# Patient Record
Sex: FEMALE | Race: WHITE | Employment: FULL TIME | URBAN - METROPOLITAN AREA
[De-identification: names, ages, dates, MRNs, and addresses within clinical notes are randomized per-mention and may not be internally consistent; named-entity substitution may affect disease eponyms.]

---

## 2020-07-27 ENCOUNTER — VIRTUAL VISIT (OUTPATIENT)
Dept: INTERNAL MEDICINE CLINIC | Age: 55
End: 2020-07-27
Payer: COMMERCIAL

## 2020-07-27 DIAGNOSIS — F51.01 PRIMARY INSOMNIA: Primary | ICD-10-CM

## 2020-07-27 DIAGNOSIS — R42 DIZZINESS: ICD-10-CM

## 2020-07-27 PROCEDURE — 99213 OFFICE O/P EST LOW 20 MIN: CPT | Performed by: INTERNAL MEDICINE

## 2020-07-27 RX ORDER — ZOLPIDEM TARTRATE 5 MG/1
5 TABLET ORAL
Qty: 30 TAB | Refills: 0 | Status: SHIPPED | OUTPATIENT
Start: 2020-07-27 | End: 2020-08-26

## 2020-07-27 NOTE — PROGRESS NOTES
Jamison Raymundo is a 54 y.o. female presenting for a virtual visit using HIPAA compliant video conferencing technology. The patient has previously provided full consent to use this technology and understands the risks and benefits of proceeding. I have seeing the patient today from my office in [place] and she is in her home. Chief Complaint   Patient presents with   Lolly Mcintosh is seen for a virtual visit today. She has just started a new job and is having some trouble sleeping. She is having trouble working 12 hours without getting a good nights rest.  She has been taking her 's Xanax. She wonders if she can get something on her own. She would probably have to take it for 5 nights a week. She formally took Ambien when she was seeing Dr. Malena Knapp and that seemed to work well for her. She is also a little concerned about some intermittent dizziness that she is having. There is no rhyme or reason to it. It comes and can last as long as an hour but just goes away without intervention. She is concerned about her blood work and would like to have that checked. No past medical history on file. No current outpatient medications on file prior to visit. No current facility-administered medications on file prior to visit. ROS generally Mayra has felt well without fevers chills night sweats or change in her weight. No visual or auditory disturbances no epistaxis no tooth or gum complaints neck no pain or stiffness no swelling. No history of thyroid disease respiratory no cough sputum production wheeze or shortness of breath no chest pain no palpitations no PND or orthopnea. No nausea or vomiting no change in bowel habits. There were no vitals taken for this visit. Physical Exam physical examination is not possible because of the virtual nature of her visit. Assessment and Plan  I am going to let Mayra try some Ambien.   She knows I do not want her to take it on a nightly basis and only use it as needed. At the same time we will check a CBC and a CMP because of her dizziness. She will call us if she has any change in her symptoms.

## 2020-08-19 ENCOUNTER — TELEPHONE (OUTPATIENT)
Dept: INTERNAL MEDICINE CLINIC | Age: 55
End: 2020-08-19

## 2020-11-02 DIAGNOSIS — F51.01 PRIMARY INSOMNIA: Primary | ICD-10-CM

## 2020-11-02 RX ORDER — ZOLPIDEM TARTRATE 5 MG/1
TABLET ORAL
Qty: 15 TAB | Refills: 0 | Status: SHIPPED | OUTPATIENT
Start: 2020-11-02 | End: 2021-01-19

## 2021-01-19 ENCOUNTER — OFFICE VISIT (OUTPATIENT)
Dept: FAMILY MEDICINE CLINIC | Age: 56
End: 2021-01-19
Payer: COMMERCIAL

## 2021-01-19 VITALS
SYSTOLIC BLOOD PRESSURE: 127 MMHG | HEART RATE: 65 BPM | OXYGEN SATURATION: 98 % | TEMPERATURE: 98.2 F | DIASTOLIC BLOOD PRESSURE: 64 MMHG

## 2021-01-19 DIAGNOSIS — R05.9 COUGH: ICD-10-CM

## 2021-01-19 DIAGNOSIS — Z20.822 SUSPECTED COVID-19 VIRUS INFECTION: ICD-10-CM

## 2021-01-19 DIAGNOSIS — Z20.822 SUSPECTED COVID-19 VIRUS INFECTION: Primary | ICD-10-CM

## 2021-01-19 DIAGNOSIS — R68.89 FLU-LIKE SYMPTOMS: ICD-10-CM

## 2021-01-19 LAB
FLUAV+FLUBV AG NOSE QL IA.RAPID: NEGATIVE
FLUAV+FLUBV AG NOSE QL IA.RAPID: NEGATIVE
VALID INTERNAL CONTROL?: YES

## 2021-01-19 PROCEDURE — 99213 OFFICE O/P EST LOW 20 MIN: CPT | Performed by: INTERNAL MEDICINE

## 2021-01-19 PROCEDURE — 87804 INFLUENZA ASSAY W/OPTIC: CPT | Performed by: INTERNAL MEDICINE

## 2021-01-19 NOTE — PROGRESS NOTES
.  This is a very pleasant patient who was seen in clinic today for an acute visit. Assessment/Plan:      1. Suspected COVID-19 virus infection  Given her positive contacts and the fact she has body aches sore throat headache generalized muscle aches cough and mild shortness of breath I strongly suspect she has COVID-19. The patient is being placed on home quarantine for 10+1 days. We will obtain a rapid Covid as well. She has been advised to go to the emergency room should she develop any crushing chest pain or worsening shortness of breath.  - NOVEL CORONAVIRUS (COVID-19); Future    2. Cough  Again refer to above. She has no rhonchi on exam and no wheezes. I am going to check a influenza as well  - NOVEL CORONAVIRUS (COVID-19); Future  - INFLUENZA A & B AG (RAPID TEST); Future       Reviewed with him that COVID-19 pandemic is an evolving situation with rapidly changing recommendations & guidelines. Medical decisions are made based on the the best information available at the time. Recommended he stay tuned for updates published by trusted sources and to advise your PCP of any unexpected changes in clinical condition       Chief Complaint   Patient presents with    Headache    Cough      Subjective:   HPI     This is a very pleasant 75-year-old female who presents today complaining of 24 hours of Malays body aches low-grade temps chills sore throat headache dry cough and intermittent shortness of breath. She reports multiple contacts of hers who has had the Covid virus including a friend whose father is now in the hospital.  She thought she was fine up until yesterday when she developed her symptoms all at once. She went to bed thinking it was nothing but then woke up just feeling awful. She describes it almost like a train hitting her.   She has not noticed any change in smell or taste  Recent Travel Screening and Travel History documentation  Exam      ROS  - GEN: no weight gain/loss, + fevers + chills  - HEENT: no vision changes, no tinnitus, + sore throat  - CV: no cp, palpitations or edema  - RESP: + sob, +cough  - ABD: no n/v/d, no blood in stool + diarrhea  - : no dysuria or changes in freq. - SKIN: no rashes, ulcers  - Neuro: no resting tremors, parasthesia in extremities, no headaches  - MS: No weakness in extremities, no gait abnormalities  - Other    Visit Vitals  /64   Pulse 65   Temp 98.2 °F (36.8 °C)   SpO2 98%        Physical Exam  Constitutional:       Appearance: Normal appearance. Ava Graven NAD and pleasant  HENT:      Head: Normocephalic. Nose: Nose normal.      Mouth/Throat:      Mouth: Mucous membranes are moist. Throat mildly inflammed without exudates  Eyes:      Extraocular Movements: Extraocular movements intact. Conjunctiva/sclera: Conjunctivae normal. Sclera anicteric     Pupils: Pupils are equal, round, and reactive to light. Cardiovascular:      Rate and Rhythm: Normal rate and regular rhythm. Pulses: Normal pulses. Pulmonary:      Effort: No respiratory distress. Breath sounds: CTAB and No stridor. No rhonchi. Abdominal:      General: There is no distension. NT, ND  Neurological:      Mental Status: She is alert and oriented times 3. No resting tremor, normal gait     Cranial Nerves: cranial nerves grossly intact    No current outpatient medications on file prior to visit. No current facility-administered medications on file prior to visit. Disclaimer:  Discussed expected course/resolution/complications of diagnosis in detail with patient. Medication risks/benefits/costs/interactions/alternatives discussed with patient. He was given an after visit summary which includes diagnoses, current medications, & vitals. He expressed understanding with the diagnosis and plan.

## 2021-01-19 NOTE — LETTER
NOTIFICATION RETURN TO WORK / SCHOOL 
 
1/19/2021 11:03 AM 
 
Ms. Megan Marquez [id number] To Whom It May Concern: 
 
Natasha Dumont is currently under the care of Abel Randall. She was seen on 1/19/2021, tested for Covid and needs to quarantine a minimum of 10 days. She will return to work/school on: Pending test results. If there are questions or concerns please have the patient contact our office. Sincerely, Carlos Julien MD

## 2021-01-19 NOTE — PROGRESS NOTES
Patient was exposed to a friend of Friday who tested positive for Covid. Body aches,fatigue,cough,headache,no appetite, nausea, shortness of breath. Lon Agrawal presents today for   Chief Complaint   Patient presents with    Headache    Cough       Is someone accompanying this pt? no    Is the patient using any DME equipment during OV? no    Depression Screening:  3 most recent PHQ Screens 1/19/2021   Little interest or pleasure in doing things Not at all   Feeling down, depressed, irritable, or hopeless Not at all   Total Score PHQ 2 0       Learning Assessment:  No flowsheet data found. Health Maintenance reviewed and discussed and ordered per Provider. Health Maintenance Due   Topic Date Due    DTaP/Tdap/Td series (1 - Tdap) 02/24/1986    PAP AKA CERVICAL CYTOLOGY  02/24/1986    Lipid Screen  02/24/2005    Shingrix Vaccine Age 50> (1 of 2) 02/24/2015    Colorectal Cancer Screening Combo  02/24/2015    Breast Cancer Screen Mammogram  02/24/2015    Flu Vaccine (1) 09/01/2020   . Coordination of Care:  1. Have you been to the ER, urgent care clinic since your last visit? Hospitalized since your last visit? no    2. Have you seen or consulted any other health care providers outside of the [address] [place] [address] since your last visit? Include any pap smears or colon screening.  no

## 2021-01-21 LAB
SARS-COV-2, NAA: NOT DETECTED
SPECIMEN STATUS REPORT, ROLRST: NORMAL

## 2023-08-15 ENCOUNTER — HOSPITAL ENCOUNTER (OUTPATIENT)
Age: 58
Discharge: HOME | End: 2023-08-18
Payer: COMMERCIAL

## 2023-08-15 DIAGNOSIS — M79.672 LEFT FOOT PAIN: ICD-10-CM

## 2023-08-15 PROCEDURE — 73630 X-RAY EXAM OF FOOT: CPT

## 2024-04-11 DIAGNOSIS — M25.562 LEFT KNEE PAIN, UNSPECIFIED CHRONICITY: Primary | ICD-10-CM

## 2024-07-25 ENCOUNTER — HOSPITAL ENCOUNTER (OUTPATIENT)
Age: 59
Discharge: HOME | End: 2024-07-25
Payer: COMMERCIAL

## 2024-07-25 DIAGNOSIS — R10.84 GENERALIZED ABDOMINAL PAIN: ICD-10-CM

## 2024-07-25 PROCEDURE — 74019 RADEX ABDOMEN 2 VIEWS: CPT

## 2024-12-17 ENCOUNTER — HOSPITAL ENCOUNTER (OUTPATIENT)
Age: 59
Setting detail: OUTPATIENT SURGERY
Discharge: HOME | End: 2024-12-17
Attending: INTERNAL MEDICINE | Admitting: INTERNAL MEDICINE
Payer: COMMERCIAL

## 2024-12-17 ENCOUNTER — ANESTHESIA (OUTPATIENT)
Age: 59
End: 2024-12-17
Payer: COMMERCIAL

## 2024-12-17 ENCOUNTER — ANESTHESIA EVENT (OUTPATIENT)
Age: 59
End: 2024-12-17
Payer: COMMERCIAL

## 2024-12-17 ENCOUNTER — PREP FOR PROCEDURE (OUTPATIENT)
Age: 59
End: 2024-12-17

## 2024-12-17 ENCOUNTER — TELEPHONE (OUTPATIENT)
Age: 59
End: 2024-12-17

## 2024-12-17 VITALS
HEART RATE: 59 BPM | OXYGEN SATURATION: 100 % | SYSTOLIC BLOOD PRESSURE: 125 MMHG | RESPIRATION RATE: 16 BRPM | DIASTOLIC BLOOD PRESSURE: 83 MMHG | TEMPERATURE: 97.6 F

## 2024-12-17 DIAGNOSIS — R63.4 WEIGHT LOSS, UNINTENTIONAL: ICD-10-CM

## 2024-12-17 DIAGNOSIS — R11.2 NAUSEA AND VOMITING, UNSPECIFIED VOMITING TYPE: ICD-10-CM

## 2024-12-17 DIAGNOSIS — R10.13 EPIGASTRIC PAIN: Primary | ICD-10-CM

## 2024-12-17 PROCEDURE — 6360000002 HC RX W HCPCS: Performed by: NURSE ANESTHETIST, CERTIFIED REGISTERED

## 2024-12-17 PROCEDURE — 3600007501: Performed by: INTERNAL MEDICINE

## 2024-12-17 PROCEDURE — 3600007511: Performed by: INTERNAL MEDICINE

## 2024-12-17 PROCEDURE — 2580000003 HC RX 258: Performed by: INTERNAL MEDICINE

## 2024-12-17 PROCEDURE — 3700000001 HC ADD 15 MINUTES (ANESTHESIA): Performed by: INTERNAL MEDICINE

## 2024-12-17 PROCEDURE — 2709999900 HC NON-CHARGEABLE SUPPLY: Performed by: INTERNAL MEDICINE

## 2024-12-17 PROCEDURE — 7100000010 HC PHASE II RECOVERY - FIRST 15 MIN: Performed by: INTERNAL MEDICINE

## 2024-12-17 PROCEDURE — 3700000000 HC ANESTHESIA ATTENDED CARE: Performed by: INTERNAL MEDICINE

## 2024-12-17 PROCEDURE — 43235 EGD DIAGNOSTIC BRUSH WASH: CPT | Performed by: INTERNAL MEDICINE

## 2024-12-17 PROCEDURE — 7100000011 HC PHASE II RECOVERY - ADDTL 15 MIN: Performed by: INTERNAL MEDICINE

## 2024-12-17 RX ORDER — DIAZEPAM 2 MG/1
2 TABLET ORAL EVERY 6 HOURS PRN
COMMUNITY
Start: 2024-11-04

## 2024-12-17 RX ORDER — FUROSEMIDE 20 MG/1
20 TABLET ORAL DAILY
COMMUNITY
Start: 2024-11-15

## 2024-12-17 RX ORDER — LEVOCETIRIZINE DIHYDROCHLORIDE 5 MG/1
5 TABLET, FILM COATED ORAL NIGHTLY
COMMUNITY
Start: 2024-12-05

## 2024-12-17 RX ORDER — SODIUM CHLORIDE, SODIUM LACTATE, POTASSIUM CHLORIDE, CALCIUM CHLORIDE 600; 310; 30; 20 MG/100ML; MG/100ML; MG/100ML; MG/100ML
INJECTION, SOLUTION INTRAVENOUS CONTINUOUS
Status: DISCONTINUED | OUTPATIENT
Start: 2024-12-17 | End: 2024-12-17 | Stop reason: HOSPADM

## 2024-12-17 RX ORDER — SODIUM CHLORIDE, SODIUM LACTATE, POTASSIUM CHLORIDE, CALCIUM CHLORIDE 600; 310; 30; 20 MG/100ML; MG/100ML; MG/100ML; MG/100ML
INJECTION, SOLUTION INTRAVENOUS CONTINUOUS
Status: CANCELLED | OUTPATIENT
Start: 2024-12-17

## 2024-12-17 RX ORDER — PROPOFOL 10 MG/ML
INJECTION, EMULSION INTRAVENOUS
Status: DISCONTINUED | OUTPATIENT
Start: 2024-12-17 | End: 2024-12-17 | Stop reason: SDUPTHER

## 2024-12-17 RX ADMIN — PROPOFOL 50 MG: 10 INJECTION, EMULSION INTRAVENOUS at 13:43

## 2024-12-17 RX ADMIN — SODIUM CHLORIDE, POTASSIUM CHLORIDE, SODIUM LACTATE AND CALCIUM CHLORIDE: 600; 310; 30; 20 INJECTION, SOLUTION INTRAVENOUS at 13:13

## 2024-12-17 RX ADMIN — PROPOFOL 200 MG: 10 INJECTION, EMULSION INTRAVENOUS at 13:44

## 2024-12-17 ASSESSMENT — PAIN - FUNCTIONAL ASSESSMENT
PAIN_FUNCTIONAL_ASSESSMENT: ADULT NONVERBAL PAIN SCALE (NPVS)
PAIN_FUNCTIONAL_ASSESSMENT: NONE - DENIES PAIN

## 2024-12-17 NOTE — ANESTHESIA POSTPROCEDURE EVALUATION
Department of Anesthesiology  Postprocedure Note    Patient: Izabela Perez  MRN: [id number]  YOB: 1965  Date of evaluation: 12/17/2024    Procedure Summary       Date: 12/17/24 Room / Location: [facility] 01 / [facility] ENDOSCOPY    Anesthesia Start: 1331 Anesthesia Stop: 1349    Procedure: EGD (Upper GI Region) Diagnosis:       Burning sensation of stomach      (Burning sensation of stomach [K30])    Surgeons: Neal Smyth MD Responsible Provider:     Anesthesia Type: MAC ASA Status: 2            Anesthesia Type: MAC    Peter Phase I:      Peter Phase II:      Anesthesia Post Evaluation    Patient location during evaluation: bedside  Patient participation: complete - patient participated  Level of consciousness: awake and awake and alert  Pain score: 0  Airway patency: patent  Nausea & Vomiting: no nausea  Cardiovascular status: blood pressure returned to baseline  Respiratory status: acceptable  Hydration status: euvolemic  Multimodal analgesia pain management approach  Pain management: adequate    No notable events documented.

## 2024-12-17 NOTE — TELEPHONE ENCOUNTER
Referring Physician: Soraida Reza, nurse practitioner    Chief Complaint: Epigastric pain and nausea    Date of service: 2024    Subjective:     History of Present Illness:  Patient is a female White (non-) 59 y.o.  who is seen for evaluation for epigastric pain and nausea.  The history is from the patient and their medical records.      The patient has GI complaints of epigastric pain and nausea for the last 10 days.  She has had no vomiting, hematemesis, melena or rectal bleeding.  She states she is lost 10 pounds unintentionally in the last 10 days because it hurts in her abdomen to eat.  She reports the pain is epigastric and does not radiate to her back or elsewhere but it is worsened with eating.    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  She has a long history of ibuprofen use.  She says for the last 15 years she is ibuprofen daily anywhere from 400-800 mg 1-4 times a day.  Until 5 days ago, she had never taken a PPI but has been using an acids.    She was seen by general surgery 2024 for abdominal pain and acute abdominal x-ray showed some nonspecific air-fluid levels in the small intestine and some moderate stool ball burden consistent with constipation but no etiology explain her pain.  CT scan of the abdomen/pelvis was ordered but I do not see that that was done.  She says she never had it done.  CBC, CMP, and lipase are all normal.    The patient denies  vomiting, fever, chills, reflux, dysphagia, change in bowel habits, diarrhea, constipation, weight loss, rectal bleeding, or melena unless otherwise stated.      Last EGD -none.  Last colonoscopy -.  Family history for GI disease is significant for mother  of colon cancer with obstruction.   The patient denies liver related risk factors.    Tobacco use -former and none in the last 10 years.  Alcohol use -occasional such as 1-3 drinks a week.  Polysubstance use

## 2024-12-17 NOTE — ANESTHESIA PRE PROCEDURE
Department of Anesthesiology  Preprocedure Note       Name:  Izabela Perez   Age:  59 y.o.  :  1965                                          MRN:  [id number]         Date:  2024      Surgeon: Surgeon(s):  Neal Smyth MD    Procedure: Procedure(s):  EGD    Medications prior to admission:   Prior to Admission medications    Not on File       Current medications:    No current facility-administered medications for this encounter.       Allergies:    Allergies   Allergen Reactions   • Azithromycin Itching     Other reaction(s): Unknown (comments)   • Codeine Itching   • Doxycycline Itching     Other reaction(s): Unknown (comments)   • Hydromorphone Hives   • Levofloxacin Itching   • Sulfa Antibiotics Hives   • Tetracycline Itching       Problem List:  There is no problem list on file for this patient.      Past Medical History:  No past medical history on file.    Past Surgical History:  No past surgical history on file.    Social History:    Social History     Tobacco Use   • Smoking status: Never   • Smokeless tobacco: Never   Substance Use Topics   • Alcohol use: Not on file                                Counseling given: Not Answered      Vital Signs (Current): There were no vitals filed for this visit.                                           BP Readings from Last 3 Encounters:   No data found for BP       NPO Status:                                                                                 BMI:   Wt Readings from Last 3 Encounters:   No data found for Wt     There is no height or weight on file to calculate BMI.    CBC: No results found for: \"WBC\", \"RBC\", \"HGB\", \"HCT\", \"MCV\", \"RDW\", \"PLT\"    CMP: No results found for: \"NA\", \"K\", \"CL\", \"CO2\", \"BUN\", \"CREATININE\", \"GFRAA\", \"AGRATIO\", \"LABGLOM\", \"GLUCOSE\", \"GLU\", \"CALCIUM\", \"BILITOT\", \"ALKPHOS\", \"AST\", \"ALT\"    POC Tests: No results for input(s): \"POCGLU\", \"POCNA\", \"POCK\", \"POCCL\", \"POCBUN\", \"POCHEMO\", \"POCHCT\" in the last 72

## 2024-12-17 NOTE — INTERVAL H&P NOTE
Update History & Physical    The patient's History and Physical of December 17, 2024 was reviewed with the patient and I examined the patient. There was no change. The surgical site was confirmed by the patient and me.     Plan: The risks, benefits, expected outcome, and alternative to the recommended procedure have been discussed with the patient. Patient understands and wants to proceed with the procedure.     Electronically signed by Neal Smyth MD on 12/17/2024 at 1:27 PM

## 2024-12-17 NOTE — DISCHARGE INSTRUCTIONS
Impression:  1.  Normal upper endoscopy.  Specifically no gastritis, duodenitis, or ulcers from NSAID use.     Recommendations:    1.  Continue present PPI therapy, which she just started..    2.  CT scan of the abdomen/pelvis with IV and oral contrast to exclude other pathology.  3.  Further recommendations pending clinical course as needed.  4.  If the CT scan is negative, then we will obtain a CCK-HIDA scan to evaluate for dysfunctional gallbladder.  5.  Follow up as needed.

## 2024-12-17 NOTE — OP NOTE
EGD procedure note    Date of procedure: 12/17/2024    Indication for procedure: Epigastric pain    Type of procedure: Upper endoscopy     Anesthesia classification: ASA class 2    Patient history and physical has been accomplished and documented.    Patient is assessed and determined to be an appropriate candidate for planned procedure and sedation.  The patient was assessed immediately prior to sedation.    Sedation plan: MAC per anesthesia.    Surgical assistant: Not applicable    Airway assessment: Range of motion: normal, mouth opening: Normal, visual obstruction: No.    PREOP EXAM:  Unchanged.    VS: Reviewed  Gen: WDWN in NAD  CV: RRR, no murmur  Resp: CTAB  Abd: Soft, NTND, +BS  Extrem: No cyanosis or edema  Neuro: Awake and alert      Informed consent obtained: Yes.  The indications, risks, including but not limited to bleeding, perforation, infection, death, potential for failure to visualize or diagnose neoplasia, alternatives, benefits, discussed in detail with the patient prior to the procedure.  Patient understands and agrees to the procedure.  Patient identity and procedure were verified, consent was obtained, and is consistent with the consent form in the patient's records.    INSTRUMENT: Olympus upper endoscope    PROCEDURE FINDINGS:    OROPHARYNX: Normal.  The oropharynx had no evidence of erythema or edema.    ESOPHAGUS:  Esophageal mucosa normal with no masses noted in the proximal, mid, and distal esophagus.   No endoscopic features of eosinophilic esophagitis were noted.  The Z-line was at 40 cm. and was normal.    No hiatal hernia was noted distally.        STOMACH: Stomach was then evaluated including the cardia and fundus on retroflexion view.  Evaluation of the gastric body, antrum, and pylorus were normal, including normal gastric mucosa with no masses, ulcers, or mucosal abnormalities.    Retroflexion view of the cardia and fundus were normal.     DUODENUM:  The duodenal bulb and

## 2024-12-17 NOTE — H&P
melena, rectal bleeding, reflux, dysphagia, jaundice, change in stool or urine color, constipation or diarrhea.   Genitourinary -no dysuria or hematuria.   Musculoskeletal-no muscle weakness or disuse or atrophy.  Neurologic-no numbness, tingling, gait disturbance, or other abnormalities.  Rheumatologic- patient denies any immune or rheumatologic diseases or symptoms.  Endocrine- patient denies any endocrine abnormalities including thyroid disease or diabetes.  Psychologic-patient denies depression, anxiety or emotional issues.  No reported memory issues.     All data/values that are blank and unpopulated have not been done or are not readily unavailable to populate the requested information.     Objective:      Physical Exam:  @VS@        Brief physical exam:     General-alert and oriented x3.  Ambulation is normal.  HEENT - no obvious facial lesions.  Abdomen-soft, nontender without hepatosplenomegaly or masses.  Neuro-no focal neurological deficits.  Musculoskeletal-good range of motion and no focal deficits.        Laboratory:   No result.  Patient says she has labs done showed no abnormalities including no anemia although I do not have these able to review.           No results found for: \"ALT\", \"AST\", \"GGT\", \"ALKPHOS\", \"BILITOT\"        No results found for: \"LIPASE\"        CAT Scan Result (most recent):  No results found for this or any previous visit from the past 3650 days.              MRI Result (most recent):  No results found for this or any previous visit from the past 3650 days.              US Result (most recent):  No results found for this or any previous visit from the past 3650 days.                       Assessment:      Epigastric pain with nausea and weight loss.  I feel this patient is at very high risk for NSAID induced gastritis and ulcer disease.  She is use frequent NSAIDs without PPI use to prevent ulcers for 15 years on a daily basis.  In fact, it would be surprising if she does not have

## 2024-12-17 NOTE — H&P (VIEW-ONLY)
Referring Physician: Soraida Reza, nurse practitioner     Chief Complaint: Epigastric pain and nausea     Date of service: 2024     Subjective:      History of Present Illness:  Patient is a female White (non-) 59 y.o.  who is seen for evaluation for epigastric pain and nausea.  The history is from the patient and their medical records.       The patient has GI complaints of epigastric pain and nausea for the last 10 days.  She has had no vomiting, hematemesis, melena or rectal bleeding.  She states she is lost 10 pounds unintentionally in the last 10 days because it hurts in her abdomen to eat.  She reports the pain is epigastric and does not radiate to her back or elsewhere but it is worsened with eating.     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  She has a long history of ibuprofen use.  She says for the last 15 years she is ibuprofen daily anywhere from 400-800 mg 1-4 times a day.  Until 5 days ago, she had never taken a PPI but has been using an acids.     She was seen by general surgery 2024 for abdominal pain and acute abdominal x-ray showed some nonspecific air-fluid levels in the small intestine and some moderate stool ball burden consistent with constipation but no etiology explain her pain.  CT scan of the abdomen/pelvis was ordered but I do not see that that was done.     The patient denies  vomiting, fever, chills, reflux, dysphagia, change in bowel habits, diarrhea, constipation, weight loss, rectal bleeding, or melena unless otherwise stated.       Last EGD -none.  Last colonoscopy -.  Family history for GI disease is significant for mother  of colon cancer with obstruction.   The patient denies liver related risk factors.     Tobacco use -former and none in the last 10 years.  Alcohol use -occasional such as 1-3 drinks a week.  Polysubstance use -none.        Past medical history is significant for Ménière's

## 2024-12-18 ENCOUNTER — HOSPITAL ENCOUNTER (OUTPATIENT)
Age: 59
Discharge: HOME | End: 2024-12-21
Attending: INTERNAL MEDICINE
Payer: COMMERCIAL

## 2024-12-18 DIAGNOSIS — R10.13 EPIGASTRIC PAIN: ICD-10-CM

## 2024-12-18 LAB — CREAT SERPL-MCNC: 0.9 MG/DL (ref 0.6–1.3)

## 2024-12-18 PROCEDURE — 36415 COLL VENOUS BLD VENIPUNCTURE: CPT

## 2024-12-18 PROCEDURE — 82565 ASSAY OF CREATININE: CPT

## 2024-12-19 ENCOUNTER — HOSPITAL ENCOUNTER (OUTPATIENT)
Age: 59
Discharge: HOME | End: 2024-12-22
Attending: INTERNAL MEDICINE
Payer: COMMERCIAL

## 2024-12-19 ENCOUNTER — TRANSCRIBE ORDERS (OUTPATIENT)
Facility: HOSPITAL | Age: 59
End: 2024-12-19

## 2024-12-19 DIAGNOSIS — R10.84 ABDOMINAL PAIN, GENERALIZED: Primary | ICD-10-CM

## 2024-12-19 PROCEDURE — 2500000003 HC RX 250 WO HCPCS: Performed by: INTERNAL MEDICINE

## 2024-12-19 PROCEDURE — 74177 CT ABD & PELVIS W/CONTRAST: CPT

## 2024-12-19 PROCEDURE — 6360000004 HC RX CONTRAST MEDICATION: Performed by: INTERNAL MEDICINE

## 2024-12-19 RX ORDER — IOPAMIDOL 755 MG/ML
100 INJECTION, SOLUTION INTRAVASCULAR
Status: COMPLETED | OUTPATIENT
Start: 2024-12-19 | End: 2024-12-19

## 2024-12-19 RX ADMIN — BARIUM SULFATE 450 ML: 21 SUSPENSION ORAL at 08:45

## 2024-12-19 RX ADMIN — IOPAMIDOL 100 ML: 755 INJECTION, SOLUTION INTRAVENOUS at 10:32

## 2024-12-20 ENCOUNTER — HOSPITAL ENCOUNTER (OUTPATIENT)
Age: 59
Discharge: HOME | End: 2024-12-23
Attending: INTERNAL MEDICINE

## 2024-12-20 DIAGNOSIS — R11.2 NAUSEA AND VOMITING, UNSPECIFIED VOMITING TYPE: ICD-10-CM

## 2024-12-20 DIAGNOSIS — R63.4 WEIGHT LOSS, UNINTENTIONAL: ICD-10-CM

## 2024-12-20 DIAGNOSIS — R10.13 EPIGASTRIC PAIN: ICD-10-CM

## 2024-12-21 DIAGNOSIS — R10.13 EPIGASTRIC PAIN: Primary | ICD-10-CM

## 2024-12-21 DIAGNOSIS — R63.4 WEIGHT LOSS, UNINTENTIONAL: ICD-10-CM

## 2024-12-23 ENCOUNTER — TELEPHONE (OUTPATIENT)
Age: 59
End: 2024-12-23

## 2024-12-23 NOTE — TELEPHONE ENCOUNTER
----- Message from Dr. Neal Smyth MD sent at 12/21/2024  7:40 AM EST -----  CT scan of abdomen and pelvis: 1 small area of fatty infiltration in the liver which is nonspecific and not of concern.  No masses or pancreatic abnormalities.  Overall very good news and nothing to explain your abdominal pain.  Please notify the patient of the above.  As I discussed with her, the next step is a CCK-HIDA scan to evaluate for dysfunctional gallbladder which I will order.

## 2024-12-23 NOTE — TELEPHONE ENCOUNTER
Contacted patient to let her know about her results and next steps. Patient stated that she is not having the burning sensation anymore. Patient states that for the last three days when she has gone to the bathroom the toliet is full of blood. Please advise.

## 2024-12-24 ENCOUNTER — SCHEDULED TELEPHONE ENCOUNTER (OUTPATIENT)
Age: 59
End: 2024-12-24

## 2024-12-24 DIAGNOSIS — K62.5 RECTAL BLEEDING: ICD-10-CM

## 2024-12-24 NOTE — PROGRESS NOTES
Patient scheduled for a colonoscopy on 12/31/2024  Instructions emailed and sent through [employer]  Surgical Registration Form scanned.  Case Request Opened

## 2024-12-30 ENCOUNTER — ANESTHESIA EVENT (OUTPATIENT)
Age: 59
End: 2024-12-30
Payer: COMMERCIAL

## 2024-12-30 ENCOUNTER — HOSPITAL ENCOUNTER (OUTPATIENT)
Age: 59
Setting detail: OUTPATIENT SURGERY
Discharge: HOME | End: 2024-12-30
Attending: INTERNAL MEDICINE | Admitting: INTERNAL MEDICINE
Payer: COMMERCIAL

## 2024-12-30 ENCOUNTER — ANESTHESIA (OUTPATIENT)
Age: 59
End: 2024-12-30
Payer: COMMERCIAL

## 2024-12-30 VITALS
HEIGHT: 67 IN | OXYGEN SATURATION: 99 % | TEMPERATURE: 97 F | HEART RATE: 68 BPM | RESPIRATION RATE: 18 BRPM | WEIGHT: 162 LBS | DIASTOLIC BLOOD PRESSURE: 67 MMHG | BODY MASS INDEX: 25.43 KG/M2 | SYSTOLIC BLOOD PRESSURE: 108 MMHG

## 2024-12-30 PROCEDURE — 3600007511: Performed by: INTERNAL MEDICINE

## 2024-12-30 PROCEDURE — 2709999900 HC NON-CHARGEABLE SUPPLY: Performed by: INTERNAL MEDICINE

## 2024-12-30 PROCEDURE — 6360000002 HC RX W HCPCS: Performed by: NURSE ANESTHETIST, CERTIFIED REGISTERED

## 2024-12-30 PROCEDURE — 3700000000 HC ANESTHESIA ATTENDED CARE: Performed by: INTERNAL MEDICINE

## 2024-12-30 PROCEDURE — 3600007501: Performed by: INTERNAL MEDICINE

## 2024-12-30 PROCEDURE — 7100000011 HC PHASE II RECOVERY - ADDTL 15 MIN: Performed by: INTERNAL MEDICINE

## 2024-12-30 PROCEDURE — 2580000003 HC RX 258: Performed by: NURSE ANESTHETIST, CERTIFIED REGISTERED

## 2024-12-30 PROCEDURE — 45378 DIAGNOSTIC COLONOSCOPY: CPT | Performed by: INTERNAL MEDICINE

## 2024-12-30 PROCEDURE — 3700000001 HC ADD 15 MINUTES (ANESTHESIA): Performed by: INTERNAL MEDICINE

## 2024-12-30 PROCEDURE — 7100000010 HC PHASE II RECOVERY - FIRST 15 MIN: Performed by: INTERNAL MEDICINE

## 2024-12-30 RX ORDER — SODIUM CHLORIDE, SODIUM LACTATE, POTASSIUM CHLORIDE, CALCIUM CHLORIDE 600; 310; 30; 20 MG/100ML; MG/100ML; MG/100ML; MG/100ML
INJECTION, SOLUTION INTRAVENOUS CONTINUOUS
Status: CANCELLED | OUTPATIENT
Start: 2024-12-30

## 2024-12-30 RX ORDER — SODIUM CHLORIDE 0.9 % (FLUSH) 0.9 %
5-40 SYRINGE (ML) INJECTION EVERY 12 HOURS SCHEDULED
Status: CANCELLED | OUTPATIENT
Start: 2024-12-30

## 2024-12-30 RX ORDER — SODIUM CHLORIDE, SODIUM LACTATE, POTASSIUM CHLORIDE, CALCIUM CHLORIDE 600; 310; 30; 20 MG/100ML; MG/100ML; MG/100ML; MG/100ML
INJECTION, SOLUTION INTRAVENOUS CONTINUOUS
Status: DISCONTINUED | OUTPATIENT
Start: 2024-12-30 | End: 2024-12-30 | Stop reason: HOSPADM

## 2024-12-30 RX ORDER — SODIUM CHLORIDE 0.9 % (FLUSH) 0.9 %
5-40 SYRINGE (ML) INJECTION EVERY 12 HOURS SCHEDULED
Status: DISCONTINUED | OUTPATIENT
Start: 2024-12-30 | End: 2024-12-30 | Stop reason: HOSPADM

## 2024-12-30 RX ORDER — NALOXONE HYDROCHLORIDE 0.4 MG/ML
INJECTION, SOLUTION INTRAMUSCULAR; INTRAVENOUS; SUBCUTANEOUS PRN
Status: CANCELLED | OUTPATIENT
Start: 2024-12-30

## 2024-12-30 RX ORDER — PROPOFOL 10 MG/ML
INJECTION, EMULSION INTRAVENOUS
Status: DISCONTINUED | OUTPATIENT
Start: 2024-12-30 | End: 2024-12-30 | Stop reason: SDUPTHER

## 2024-12-30 RX ADMIN — SODIUM CHLORIDE, POTASSIUM CHLORIDE, SODIUM LACTATE AND CALCIUM CHLORIDE: 600; 310; 30; 20 INJECTION, SOLUTION INTRAVENOUS at 12:01

## 2024-12-30 RX ADMIN — PROPOFOL 50 MG: 10 INJECTION, EMULSION INTRAVENOUS at 12:14

## 2024-12-30 RX ADMIN — PROPOFOL 50 MG: 10 INJECTION, EMULSION INTRAVENOUS at 12:10

## 2024-12-30 RX ADMIN — PROPOFOL 100 MG: 10 INJECTION, EMULSION INTRAVENOUS at 12:05

## 2024-12-30 RX ADMIN — SODIUM CHLORIDE, POTASSIUM CHLORIDE, SODIUM LACTATE AND CALCIUM CHLORIDE: 600; 310; 30; 20 INJECTION, SOLUTION INTRAVENOUS at 11:03

## 2024-12-30 RX ADMIN — PROPOFOL 50 MG: 10 INJECTION, EMULSION INTRAVENOUS at 12:19

## 2024-12-30 ASSESSMENT — PAIN - FUNCTIONAL ASSESSMENT
PAIN_FUNCTIONAL_ASSESSMENT: NONE - DENIES PAIN

## 2024-12-30 NOTE — ANESTHESIA POSTPROCEDURE EVALUATION
Department of Anesthesiology  Postprocedure Note    Patient: Izabela Perez  MRN: [id number]  YOB: 1965  Date of evaluation: 12/30/2024    Procedure Summary       Date: 12/30/24 Room / Location: [facility] 01 / [facility] ENDOSCOPY    Anesthesia Start: 1201 Anesthesia Stop: 1225    Procedure: COLONOSCOPY (Lower GI Region) Diagnosis:       Rectal bleeding      (Rectal bleeding [K62.5])    Surgeons: Neal Smyth MD Responsible Provider: Hector Bruce APRN - CRNA    Anesthesia Type: MAC ASA Status: 1            Anesthesia Type: MAC    Peter Phase I:      Peter Phase II:      Anesthesia Post Evaluation    Patient location during evaluation: bedside  Patient participation: complete - patient participated  Level of consciousness: awake and alert  Airway patency: patent  Nausea & Vomiting: no nausea and no vomiting  Cardiovascular status: hemodynamically stable and blood pressure returned to baseline  Respiratory status: acceptable and room air  Hydration status: euvolemic  Pain management: adequate    No notable events documented.

## 2024-12-30 NOTE — OP NOTE
Colonoscopy procedure note    Date of service: 12/30/2024    Type: Diagnostic    Indication for procedure: Rectal bleeding, weight loss, and history of colon polyps    Anesthesia classification: ASA class 2    Patient history and physical been accomplished and documented.  Patient is assessed and determined to be appropriate candidate for planned procedure and sedation; patient reassessed immediately prior to sedation.      Sedation plan: MAC per anesthesia    Surgical assistant: Not applicable    Airway assessment: Range of motion: Normal, mouth opening, Visual obstruction: No.    UPDATED PREOP EXAM:  Unchanged.    VS: Reviewed  Gen: in NAD  CV: RRR, no murmur  Resp: CTA  Abd: Soft, NTND, +BS  Extrem: No cyanosis or edema  Neuro: Awake and alert    Informed consent obtained: Yes.  The indications, risks including but not limited to bleeding, perforation, infection, death, and potential failure to visual areas are diagnosed neoplasia, alternatives and benefits were discussed with the patient prior to the procedure.  Patient identity and procedure was verified, absent was obtained, and is consistent with the consent form found in the patient's records.    PROCEDURE PERFORMED:  COLONOSCOPY  to the cecum with MAC     INSTRUMENT: Olympus colonoscope per nursing notes.    FINDINGS:    External anal lesions: Normal   Rectum: normal.  Rectal sphincter tone was normal.   Retroflexion view: Grade 2 internal hemorrhoids.  Sigmoid: normal   Descending Colon: normal   Transverse Colon: normal   Ascending Colon: normal   Cecum: normal, including the appendiceal orifice and ileocecal valve.    Terminal ileum: not evaluated     Specimens: none     Bowel preparation- adequate to detect small (5mm) polyps or larger.  Although the bowel preparation was adequate, it did require a great deal of washing and suctioning to remove liquid stool scattered throughout the colon.  Estimated blood loss: none   Complications:  none   Cecal

## 2024-12-30 NOTE — INTERVAL H&P NOTE
Update History & Physical    The patient's History and Physical of December 30, 2024 was reviewed with the patient and I examined the patient. There was no change. The surgical site was confirmed by the patient and me.  Since the patient's original evaluation with EGD which was normal, she has had 7 days of rectal bleeding in addition to her weight loss and has a history of polyps.  Therefore she is going to undergo colonoscopy today to evaluate the above.  CT scan of the abdomen pelvis showed no abnormalities of note.    Plan: The risks, benefits, expected outcome, and alternative to the recommended procedure have been discussed with the patient. Patient understands and wants to proceed with the procedure.     Electronically signed by Neal Smyth MD on 12/30/2024 at 11:18 AM

## 2024-12-30 NOTE — ANESTHESIA PRE PROCEDURE
normal exam  breath sounds clear to auscultation                             Cardiovascular:Negative CV ROS          NYHA Classification: II    Rhythm: regular  Rate: normal                    Neuro/Psych:   Negative Neuro/Psych ROS              GI/Hepatic/Renal: Neg GI/Hepatic/Renal ROS            Endo/Other: Negative Endo/Other ROS                    Abdominal:             Vascular: negative vascular ROS.         Other Findings:       Anesthesia Plan      MAC     ASA 1       Induction: intravenous.  continuous noninvasive hemodynamic monitor    Anesthetic plan and risks discussed with patient.    Use of blood products discussed with patient whom.    Plan discussed with surgical team.                Hector Bruce APRN - CRNA   12/30/2024

## 2025-01-22 ENCOUNTER — HOSPITAL ENCOUNTER (OUTPATIENT)
Age: 60
Discharge: HOME | End: 2025-01-25
Payer: COMMERCIAL

## 2025-01-22 DIAGNOSIS — M54.50 RECURRENT LOW BACK PAIN: ICD-10-CM

## 2025-01-22 PROCEDURE — 72100 X-RAY EXAM L-S SPINE 2/3 VWS: CPT

## 2025-03-04 ENCOUNTER — HOSPITAL ENCOUNTER (OUTPATIENT)
Age: 60
Setting detail: RECURRING SERIES
Discharge: HOME | End: 2025-03-07
Payer: COMMERCIAL

## 2025-03-04 PROCEDURE — 97161 PT EVAL LOW COMPLEX 20 MIN: CPT

## 2025-03-04 NOTE — THERAPY EVALUATION
THORACOLUMBAR EVAL/ PT DAILY TREATMENT NOTE 10-18    Patient Name: Izabela Perez  Date:3/4/2025  : 1965  [x]  Patient  Verified  Payor: TRAM REYES / Plan: [employer] [place] / Product Type: *No Product type* /    In time:3:00  Out time:3:50  Total Treatment Time (min): 50  Visit #: 1    Medicare/BCBS Only   Total Timed Codes (min):  0 1:1 Treatment Time:  50     Treatment Area: Low back pain, unspecified [M54.50]      Subjective:     Pt reports to [facility] with complaint of low back pain. She notes that she had previous x-rays. Pt had previous GI issues that have resolved. Pt notes that her main complaint is that she has trouble sleeping at night. Her pain during the day is worse in the morning and gets better as the day goes on. 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 Pt sleeps on her stomach and her sides. She notes that when she changes positions is when she wakes up and takes 15-20 min to go back to sleep. Pt had previous 'sciatica' into her L LE, but denies any symptoms now into her L LE. Pt runs 3-4 miles at least 3 days a week with a back brace. She denies any LBP with running.     Patient Goals: \"I want to be able to sleep better.\"    Pain Level (0-10 scale): 0/10 current, 7/10 worst, 0/10 best   [x]Sharp  []Dull  []Achy [x]Burning []Throbbing []N&T []Other:  []constant []intermittent []improving []worsening []no change since onset      Symptoms:  Aggravated by:   [x] Bending [] Sitting [] Standing [] Walking   [] Moving [] Cough [] Sneeze [] Valsalva   [] AM  [] PM  Lying:  [] sup   [] pro   [] sidelying   [x] Other: moving positions when sleeping     Eased by:    [] Bending [] Sitting [] Standing [] Walking   [] Moving [] AM  [] PM  Lying: [] sup  [] pro  [] sidelying   [x] Other: yoga and stretching     General Health:  Red Flags Indicated? [] Yes    [x] No  [] Yes [] No Recent weight change (If yes, due to dieting? [] Yes  [] No)   [] Yes [] No Weakness in legs

## 2025-03-04 NOTE — THERAPY EVALUATION
[facility] REHABILIT[facility]  PHYSICAL THERAPY  [facility], [address] [place]  [address]  Phone: [phone]    Fax: [phone]     PLAN OF CARE / STATEMENT OF MEDICAL NECESSITY FOR PHYSICAL THERAPY SERVICES  Patient Name: Izabela Perez : 1965   Medical   Diagnosis: Low back pain, unspecified [M54.50] Treatment Diagnosis: Low back pain   Onset Date: 6 months ago     Referral Source: Soraida Reza APRN * Start of Care (SOC): 3/4/2025   Prior Hospitalization: See medical history Provider #: [id number]   Prior Level of Function: Independent    Comorbidities: See Chart   Medications: Verified on Patient Summary List   The Plan of Care and following information is based on the information from the initial evaluation.   ==========================================================================================  Assessment / Functional Analysis:    Pt is a 60 y.o. year old  female who presents to outpatient clinic today with complaint of low back pain for roughly 6 months. Pt denies any visit to spine specialists, and wanted to try PT with HEP emphasis. During evaluation, patient exhibits good core control and LE strength. Also noted a leg length discrepancy of 2cm with her L LE being longer than her R LE. Suggested trial of heel lift in her R LE. Her ROM measurements are WFL, and her main complaint of LBP occurs at night when moving positions while sleeping. Over the last few weeks, she has been performing yoga, and noticed a decrease in her pain levels. Pt encouraged to continue yoga exercises and was given comprehensive HEP at evaluation. Due to recent medical bills and schedule, patient opted to perform HEP and f/u with spine specialist in the coming weeks for possibility of injection. Plan to leave patient case open for 30 days and f/u with PT as needed for updated

## 2025-04-10 ENCOUNTER — OFFICE VISIT (OUTPATIENT)
Age: 60
End: 2025-04-10

## 2025-04-10 VITALS — BODY MASS INDEX: 26.53 KG/M2 | WEIGHT: 169 LBS | HEIGHT: 67 IN

## 2025-04-10 DIAGNOSIS — M79.641 RIGHT HAND PAIN: ICD-10-CM

## 2025-04-10 DIAGNOSIS — M79.621 PAIN IN RIGHT UPPER ARM: Primary | ICD-10-CM

## 2025-04-10 DIAGNOSIS — M75.51 BURSITIS OF RIGHT SHOULDER: ICD-10-CM

## 2025-04-10 RX ORDER — TRIAMCINOLONE ACETONIDE 40 MG/ML
40 INJECTION, SUSPENSION INTRA-ARTICULAR; INTRAMUSCULAR ONCE
Status: COMPLETED | OUTPATIENT
Start: 2025-04-10 | End: 2025-04-10

## 2025-04-10 RX ORDER — LIDOCAINE HYDROCHLORIDE 10 MG/ML
9 INJECTION, SOLUTION INFILTRATION; PERINEURAL ONCE
Status: COMPLETED | OUTPATIENT
Start: 2025-04-10 | End: 2025-04-10

## 2025-04-10 RX ADMIN — TRIAMCINOLONE ACETONIDE 40 MG: 40 INJECTION, SUSPENSION INTRA-ARTICULAR; INTRAMUSCULAR at 15:01

## 2025-04-10 RX ADMIN — LIDOCAINE HYDROCHLORIDE 9 ML: 10 INJECTION, SOLUTION INFILTRATION; PERINEURAL at 15:02

## 2025-04-10 NOTE — PROGRESS NOTES
Name: Izabela Perez    : 1965     [facility] ORTHOPAEDICS AND SPORTS MEDICINE  [address]  [facility] [address]  Dept: [phone]  Dept Fax: [phone]     Chief Complaint   Patient presents with    Shoulder Pain    Elbow Pain    Finger Pain        Ht 1.702 m (5' 7\")   Wt 76.7 kg (169 lb)   BMI 26.47 kg/m²      Allergies   Allergen Reactions    Azithromycin Itching     Other reaction(s): Unknown (comments)    Codeine Itching    Doxycycline Itching     Other reaction(s): Unknown (comments)    Hydromorphone Hives    Levofloxacin Itching    Sulfa Antibiotics Hives    Tetracycline Itching        Current Outpatient Medications   Medication Sig Dispense Refill    furosemide (LASIX) 20 MG tablet Take 1 tablet by mouth daily      diazePAM (VALIUM) 2 MG tablet Take 1 tablet by mouth every 6 hours as needed (dizziness).      FLUoxetine (PROZAC) 20 MG capsule Take 1 capsule by mouth daily      levocetirizine (XYZAL) 5 MG tablet Take 1 tablet by mouth nightly      omeprazole (PRILOSEC) 20 MG delayed release capsule Take 1 capsule by mouth daily       Current Facility-Administered Medications   Medication Dose Route Frequency Provider Last Rate Last Admin    lidocaine 1 % injection 9 mL  9 mL Other Once         triamcinolone acetonide (KENALOG-40) injection 40 mg  40 mg Other Once            Patient Active Problem List   Diagnosis    Rectal bleeding      Family History   Problem Relation Age of Onset    Cancer Mother     Cancer Father        Past Surgical History:   Procedure Laterality Date    COLONOSCOPY N/A 2024    COLONOSCOPY performed by Neal Smyth MD at [facility] ENDOSCOPY    UPPER GASTROINTESTINAL ENDOSCOPY N/A 2024    EGD performed by Neal Smyth MD at [facility] ENDOSCOPY      Past Medical History:   Diagnosis Date    Meniere disease         I have reviewed and agree with PFSH and ROS and intake form in chart and the

## 2025-08-01 ENCOUNTER — OFFICE VISIT (OUTPATIENT)
Age: 60
End: 2025-08-01

## 2025-08-01 DIAGNOSIS — M79.621 PAIN IN RIGHT UPPER ARM: ICD-10-CM

## 2025-08-01 DIAGNOSIS — M75.51 BURSITIS OF RIGHT SHOULDER: Primary | ICD-10-CM

## 2025-08-01 RX ORDER — TRIAMCINOLONE ACETONIDE 40 MG/ML
40 INJECTION, SUSPENSION INTRA-ARTICULAR; INTRAMUSCULAR ONCE
Status: COMPLETED | OUTPATIENT
Start: 2025-08-01 | End: 2025-08-01

## 2025-08-01 RX ORDER — LIDOCAINE HYDROCHLORIDE 10 MG/ML
9 INJECTION, SOLUTION INFILTRATION; PERINEURAL ONCE
Status: COMPLETED | OUTPATIENT
Start: 2025-08-01 | End: 2025-08-01

## 2025-08-01 RX ADMIN — LIDOCAINE HYDROCHLORIDE 9 ML: 10 INJECTION, SOLUTION INFILTRATION; PERINEURAL at 14:34

## 2025-08-01 RX ADMIN — TRIAMCINOLONE ACETONIDE 40 MG: 40 INJECTION, SUSPENSION INTRA-ARTICULAR; INTRAMUSCULAR at 14:35

## 2025-08-01 NOTE — PROGRESS NOTES
Name: Izabela Perez    : 1965     [facility] ORTHOPAEDICS AND SPORTS MEDICINE  [address], [address]  [facility] [address]  Dept: [phone]  Dept Fax: [phone]   Chief Complaint   Patient presents with    Shoulder Pain     RIGHT     There were no vitals taken for this visit.   Allergies   Allergen Reactions    Azithromycin Itching     Other reaction(s): Unknown (comments)    Codeine Itching    Doxycycline Itching     Other reaction(s): Unknown (comments)    Hydromorphone Hives    Levofloxacin Itching    Sulfa Antibiotics Hives    Tetracycline Itching     Current Outpatient Medications   Medication Sig Dispense Refill    furosemide (LASIX) 20 MG tablet Take 1 tablet by mouth daily      diazePAM (VALIUM) 2 MG tablet Take 1 tablet by mouth every 6 hours as needed (dizziness).      FLUoxetine (PROZAC) 20 MG capsule Take 1 capsule by mouth daily      levocetirizine (XYZAL) 5 MG tablet Take 1 tablet by mouth nightly      omeprazole (PRILOSEC) 20 MG delayed release capsule Take 1 capsule by mouth daily       No current facility-administered medications for this visit.       Patient Active Problem List   Diagnosis    Rectal bleeding      Family History   Problem Relation Age of Onset    Cancer Mother     Cancer Father        Past Surgical History:   Procedure Laterality Date    COLONOSCOPY N/A 2024    COLONOSCOPY performed by Neal Smyth MD at [facility] ENDOSCOPY    UPPER GASTROINTESTINAL ENDOSCOPY N/A 2024    EGD performed by Neal Smyth MD at [facility] ENDOSCOPY      Past Medical History:   Diagnosis Date    Meniere disease         I have reviewed and agree with PFS and [facility] and intake form in chart and the record furthermore I have reviewed prior medical record(s) regarding this patients care during this appointment.   Review of Systems:   Patient is a pleasant appearing individual, appropriately dressed, well hydrated, well

## 2025-08-01 NOTE — PATIENT INSTRUCTIONS
Patient Education        Shoulder Pain: Care Instructions  Overview     You can hurt your shoulder by using it too much during an activity, such as fishing or baseball. It can also happen as part of the everyday wear and tear of getting older. Shoulder injuries can be slow to heal, but your shoulder should get better with time.  Your doctor may recommend a sling to rest your shoulder. If you have injured your shoulder, you may need testing and treatment.  Follow-up care is a key part of your treatment and safety. Be sure to make and go to all appointments, and call your doctor if you are having problems. It's also a good idea to know your test results and keep a list of the medicines you take.  How can you care for yourself at home?  Take pain medicines exactly as directed.  If the doctor gave you a prescription medicine for pain, take it as prescribed.  If you are not taking a prescription pain medicine, ask your doctor if you can take an over-the-counter medicine.  Do not take two or more pain medicines at the same time unless the doctor told you to. Many pain medicines contain acetaminophen, which is Tylenol. Too much acetaminophen (Tylenol) can be harmful.  If your doctor recommends that you wear a sling, use it as directed. Do not take it off before your doctor tells you to.  Put ice or a cold pack on the sore area for 10 to 20 minutes at a time. Put a thin cloth between the ice and your skin.  If there is no swelling, you can put moist heat, a heating pad, or a warm cloth on your shoulder. Some doctors suggest alternating between hot and cold.  Rest your shoulder for a few days. If your doctor recommends it, you can then begin gentle exercise of the shoulder, but do not lift anything heavy.  When should you call for help?   Call 911 anytime you think you may need emergency care. For example, call if:    You have chest pain or pressure. This may occur with:  Sweating.  Shortness of breath.  Nausea or

## (undated) DEVICE — SOLUTION IRRIG 500ML STRL H2O NONPYROGENIC

## (undated) DEVICE — TUBING, SUCTION, 9/32" X 10', STRAIGHT: Brand: MEDLINE

## (undated) DEVICE — Device: Brand: DEFENDO VALVE AND CONNECTOR KIT

## (undated) DEVICE — TUBING INSUFFLATION CAP W/ EXT CARBON DIOX ENDO SMARTCAP

## (undated) DEVICE — CONMED SCOPE SAVER BITE BLOCK, 20X27 MM: Brand: SCOPE SAVER

## (undated) DEVICE — TUBING IRRIGATION BK FLO VLV FOR OFP ENDOSTAT ENDOGATOR DISP

## (undated) DEVICE — SOLUTION IRRIG 1000ML STRL H2O USP PLAS POUR BTL

## (undated) DEVICE — KIT COLON W/ 1.1OZ LUB AND 2 END